# Patient Record
(demographics unavailable — no encounter records)

---

## 2022-04-27 NOTE — MM
Reason for exam: screening  (asymptomatic).

Last mammogram was performed 1 year and 1 month ago.



History:

Patient is postmenopausal.

Family history of breast cancer in paternal aunt.



Physical Findings:

A clinical breast exam by your physician is recommended on an annual basis and 

results should be correlated with mammographic findings.



MG 3D Screening Mammo W/Cad

Bilateral CC and MLO view(s) were taken.

Prior study comparison: April 7, 2021, bilateral MG 3d screening mammo w/cad.  

December 16, 2019, bilateral MG screening mammo w CAD.

There are scattered fibroglandular densities.  No significant changes when 

compared with prior studies.





ASSESSMENT: Benign, BI-RAD 2



RECOMMENDATION:

Routine screening mammogram of both breasts in 1 year.

## 2023-04-27 NOTE — US
EXAMINATION TYPE: US transvaginal

 

DATE OF EXAM: 4/27/2023

 

COMPARISON: NONE

 

CLINICAL INDICATION: Female, 60 years old with history of R102 PELVIC PAIN; Pt has no complaints at t
his time, pt states mother was diagnosed with ovarian cancer approx 1-2 years ago

 

TECHNIQUE: Transvaginal sonographic images of the pelvis were acquired.

**Pt unable to fill bladder properly for transabdominal scan

 

Date of LMP:  pt states age 45

 

EXAM MEASUREMENTS:

 

Uterus:  7.2 x 3.7 x 4.7 cm

Endometrial Stripe: 0.5 cm

Right Ovary:  1.8 x 1.4 x 1.0 cm

Left Ovary:  2.1 x 1.4 x 1.4 cm

 

 

 

1. Uterus:  Anteverted.   The myometrium is mildly heterogeneous.

2. Endometrium:  wnl

3. Right Ovary:  wnl

4. Left Ovary:  wnl

5. Bilateral Adnexa:  Scant amount of free fluid within right adnexa

6. Posterior cul-de-sac:  wnl

 

 

 

IMPRESSION:

Scant free fluid within the right adnexa of unknown etiology and clinical significance. Consider foll
ow-up. Otherwise, no specific abnormality seen.

## 2023-04-27 NOTE — BD
EXAMINATION TYPE: Axial Bone Density

 

DATE OF EXAM: 4/27/2023

 

CLINICAL HISTORY: 60 years old Female.  ICD-10 CODE: , E55.9 SCREENING

 

Height:  65in

Weight:  154lb

 

FRAX RISK QUESTIONS:

 

Secondary Osteoporosis:

    Current Tobacco Use: yes

 

RISK FACTORS 

HISTORY OF: 

Family History of Osteoporosis: yes

Active: yes

Postmenopausal woman: yes

 

MEDICATIONS: 

Additional Medications: none 

 

 

Additional History: none

 

 

EXAM MEASUREMENTS: 

Bone mineral densitometry was performed using the [employer] System.

Bone mineral density as measured about the Lumbar spine is:  

----- L1-L4(G/cm2): 0.992

T Score Values are as follows:

----- L1: -1.5

----- L2: -1.7

----- L3: -1.2

----- L4: -1.9

----- L1-L4: -1.6

 

Z Score Values are as follows:

----- L1: -0.4

----- L2: -0.7

----- L3: -0.2

----- L4: -0.9

----- L1-L4: -0.5

 

First dexa at [facility]

 

Bone mineral density about the R hip (g/cm2): 0.852

Bone mineral density about the L hip (g/cm2): 0.964

T Score values are as follows:

-----R Neck: -1.9

-----L Neck: -1.2

-----R Total: -1.2

-----L Total: -0.3

 

Z Score values are as follows:

-----R Neck: -0.7

-----L Neck: -0.1

-----R Total: -0.4

-----L Total: 0.5

 

 

FRAX%s: The graph provided illustrates a 9.6% chance for a major osteoporotic fx and a 1.9% chance fo
r the hips probability for fx in 10 years time.

 

 

 

 

IMPRESSION:

Osteopenia (T Score between -2.5 and -1).

 

There is slightly increased risk of fracture and the patient may be considered 

for treatment. 

 

Re-Screen 2-5 years.

 

NOTE:  T-SCORE=SD OF THE YOUNG ADULT MEAN.

## 2023-04-28 NOTE — MM
Reason for Exam: Screening  (asymptomatic). 

Last screening mammogram was performed 12 month(s) ago.





Patient History: 

Menarche at age 12. First Full-Term Pregnancy at age 17. Postmenopausal.

Paternal aunt had breast cancer. 





Risk Values: 

Ирина 5 year model risk: 1.0%.

NCI Lifetime model risk: 5.3%.





Prior Study Comparison: 

12/16/2019 Bilateral Screening Mammogram, [facility]. 4/7/2021 Bilateral Screening Mammogram, [facility].

4/26/2022 Bilateral Screening Mammogram, [facility]. 





Tissue Density: 

The breast tissue is heterogeneously dense. This may lower the sensitivity of mammography.





Findings: 

Analyzed By CAD. 

There are scattered and loosely grouped tiny benign-appearing round calcifications redemonstrated

throughout the bilateral breasts. Benign appearing bilateral axillary lymph nodes are

redemonstrated.



There is no suspicious group of microcalcifications or new suspicious mass in either breast. 





Overall Assessment: Benign, BI-RAD 2





Management: 

Screening Mammogram of both breasts in 1 year.

.



Patient should continue monthly self-breast exams.  A clinical breast exam by your physician is

recommended on an annual basis.

This exam should not preclude additional follow-up of suspicious palpable abnormalities.



Note on Ирина scores and lifetime risk:

1. A Ирина score greater than 3% is considered moderate risk. If this is the case, consider

specialist referral to assess eligibility for a risk reducing agent.

2. If overall lifetime risk for the development of breast cancer is 20% or higher, the patient may

qualify for future screening with alternating mammogram and breast MRI.



Electronically signed and approved by: Bob Lopez M.D.

## 2023-08-30 NOTE — MR
EXAMINATION TYPE: MR brain and iac wo/w con

 

DATE OF EXAM: 8/30/2023

 

COMPARISON: None

 

HISTORY: Rt side ringing in ear

 

TECHNIQUE: 

Multiplanar, multisequence images of the brain and brainstem is performed without and with IV contras
t, utilizing 7 mL intravenous Gadavist .

 

FINDINGS: Diffusion weighted images demonstrate no evidence of a recent infarct or other diffusion ab
normality.

 

Mild generalized degenerative change. Faint periventricular areas of signal compatible with remote wh
ite matter ischemia. Midline structures demonstrate normal morphology.  

 

There is nodular prominence of the left MCA. The craniocervical junction appears within normal limits
.  Post contrast images demonstrate no abnormal enhancement. The dural venous sinuses appear patent.

 

Changes of chronic mastoiditis. Orbits are symmetric and there is mild chronic sinusitis. There is no
 evidence of cerebellopontine angle mass or acoustic schwannoma.

 

IMPRESSION: 

1. No evidence of cerebellopontine angle mass or acoustic schwannoma.

2. Mild changes of chronic sinusitis and mastoiditis.

3. There is nodular prominence of the left MCA. Small aneurysm in the differential diagnosis. Recomme
nd follow-up MRA [place].

## 2024-05-01 NOTE — US
EXAMINATION TYPE: US pelvis complete transvag

 

DATE OF EXAM: 2024

 

COMPARISON: 

US 2023

 

 

CLINICAL INDICATION: Female, 61 years old with history of ; R10.2 PELVIC AND PERINEAL FARHAD; Mother pas
sed recently from ovarian cancer; Hx Tubal; ; patient denies any signs, symptoms, or  relevant h
istory 

 

TECHNIQUE: .  Transabdominal sonographic images of the pelvis were acquired.  Transvaginal sonographi
c images were medically necessary to better assess the following anatomy: Ovaries

 

Date of LMP:  10+ years ago

 

EXAM MEASUREMENTS:

 

Uterus:  7.0 x 3.4 x 4.6 cm

Endometrial Stripe: 0.4 cm

Right Ovary:  1.5 x 1.0 x 0.9 cm

Left Ovary:  1.7 x 1.2 x 0.9 cm

 

 

 

1. Uterus:  Anteverted   wnl

2. Endometrium:  wnl

3. Right Ovary:  wnl

4. Left Ovary:  wnl

5. Bilateral Adnexa:  wnl

6. Posterior cul-de-sac:  wnl

 

 

 

IMPRESSION: 

1. Normal pelvic ultrasound.

## 2024-05-02 NOTE — MM
Reason for Exam: Screening  (asymptomatic). 

Last screening mammogram was performed 12 month(s) ago.





Patient History: 

Menarche at age 12. First Full-Term Pregnancy at age 17. Postmenopausal.

Paternal aunt had breast cancer. 





Risk Values: 

Ирина 5 year model risk: 1.1%.

NCI Lifetime model risk: 5.2%.





Prior Study Comparison: 

4/7/2021 Bilateral Screening Mammogram, [facility]. 4/26/2022 Bilateral Screening Mammogram, [facility]. 4/27/2023

Bilateral MG 3D screening mammo w/cad, [facility]. 





Tissue Density: 

The breasts are heterogeneously dense, which may obscure small masses.





Findings: 

Analyzed By CAD. 

There is no suspicious group of microcalcifications or new suspicious mass in either breast. Tiny

calcifications including a loosely grouped calcifications in the posterior right inner breast

unchanged from 2019. 





Overall Assessment: Benign, BI-RAD 2





Management: 

Screening Mammogram of both breasts in 1 year.

.



Patient should continue monthly self-breast exams.  A clinical breast exam by your physician is

recommended on an annual basis.

This exam should not preclude additional follow-up of suspicious palpable abnormalities.



Note on Ирина scores and lifetime risk:

1. A Ирина score greater than 3% is considered moderate risk. If this is the case, consider

specialist referral to assess eligibility for a risk reducing agent.

2. If overall lifetime risk for the development of breast cancer is 20% or higher, the patient may

qualify for future screening with alternating mammogram and breast MRI.



Electronically signed and approved by: Isidro Gan M.D. Radiologis

## 2025-03-11 NOTE — CTL
EXAMINATION TYPE:  CT Low Dose Lung

 

DATE OF EXAM ORDERED: 3/11/2025

 

COMPARISON: None.

 

CLINICAL INDICATION: Female, 62 years old with history of Z12.2 SCREENING LUNG CA  F17.210 CURRENT SM
OKER; [facility], Current smoker 1/2 ppd x 40 years, no concerns, Lung cancer screening, History of Smoking/
tobacco use.

 

TECHNIQUE: Low dose computed tomography scan was performed through the chest at 1 mm thick sections a
nd reconstructed images in multiple planes at 1 mm and 5 mm thick sections.

CT DLP: 116.50 mGycm

CT CTDI: 3.0 mGy

Automated exposure control for dose reduction was used.

CT DIAGNOSTIC QUALITY: Satisfactory

 

FINDINGS:

 

Nodules: A few scattered tiny nodules. No greater than 6 mm pulmonary nodules.

RUL:  For reference there is a 4 x 2 mm right upper lobe pulmonary nodule axial image 84.

RML:  For reference is a 2 to 3 mm peripheral right middle lobe nodule axial image 149. For reference
 is a 3 mm peripheral nodule axial image 143.

RLL:  None.

LISSET:  None.

LLL:  None.

 

LUNGS:

COPD: Severity: Mild

Fibrosis: Severity: None

Lymph nodes: None

Other findings: None

 

RIGHT PLEURAL SPACE:

Effusion: None

Calcification: None

Thickening: None

Pneumothorax: None

 

LEFT PLEURAL SPACE:

Effusion: None

Calcification: None

Thickening: None

Pneumothorax: None

 

HEART:  

Heart Size: Normal

Coronary Calcification: Mild to moderate

Pericardial Effusion: None

 

OTHER FINDINGS:

Upper abdomen: Small sized hiatal hernia.

Bony thorax: None

Supraclavicular region: None

Other: None

 

IMPRESSION: Mild emphysematous change with few scattered tiny bilateral nodules. No greater than 6 mm
 pulmonary nodules.

 

CT LUNG RAD AND CT CHEST RECOMMENDATION: Lung-Rad 2 Benign Appearance or Behavior: Continue annual sc
reening with LDCT in 12 months.

 

S Modifier (other clinically significant findings): None

 

 

 

 

[facility], Workstation: [id number], 3/11/2025 8:25 AM